# Patient Record
(demographics unavailable — no encounter records)

---

## 2025-02-17 NOTE — PLAN
[FreeTextEntry1] : BW drawn today POCT strep -negative Encourage prenatal vitamin Encourage 150 minutes of physical activity a week Encouraged well balanced diet, low carbohydrates/fatty foods follow up 1 year or sooner if needed

## 2025-02-17 NOTE — PHYSICAL EXAM
[No Acute Distress] : no acute distress [Well Nourished] : well nourished [Well Developed] : well developed [Normal Sclera/Conjunctiva] : normal sclera/conjunctiva [Well-Appearing] : well-appearing [PERRL] : pupils equal round and reactive to light [EOMI] : extraocular movements intact [Normal Outer Ear/Nose] : the outer ears and nose were normal in appearance [Normal Oropharynx] : the oropharynx was normal [No Lymphadenopathy] : no lymphadenopathy [Supple] : supple [Thyroid Normal, No Nodules] : the thyroid was normal and there were no nodules present [No Respiratory Distress] : no respiratory distress  [No Accessory Muscle Use] : no accessory muscle use [Clear to Auscultation] : lungs were clear to auscultation bilaterally [Normal Rate] : normal rate  [Regular Rhythm] : with a regular rhythm [Normal S1, S2] : normal S1 and S2 [No Murmur] : no murmur heard [No Varicosities] : no varicosities [Pedal Pulses Present] : the pedal pulses are present [No Edema] : there was no peripheral edema [Soft] : abdomen soft [Non Tender] : non-tender [Non-distended] : non-distended [No Masses] : no abdominal mass palpated [Normal Posterior Cervical Nodes] : no posterior cervical lymphadenopathy [Normal Anterior Cervical Nodes] : no anterior cervical lymphadenopathy [No CVA Tenderness] : no CVA  tenderness [No Spinal Tenderness] : no spinal tenderness [Grossly Normal Strength/Tone] : grossly normal strength/tone [No Rash] : no rash [Normal Gait] : normal gait [Normal Affect] : the affect was normal [Normal Insight/Judgement] : insight and judgment were intact

## 2025-02-17 NOTE — HISTORY OF PRESENT ILLNESS
[FreeTextEntry1] : CPE [de-identified] : 30yo F presents for CPE. Pt reports no acute complaints, no medications, no changes to medical hx since last CPE. Pt reports no BCM at this time, reports tracking menses and condom usage.   Pt denies any CP, palpitations, SOB, PICHARDO, fevers, chills, abdominal pain, N/V, diarrhea, constipation, BRBPR or dark tarry stools.

## 2025-02-17 NOTE — HEALTH RISK ASSESSMENT
[Good] : ~his/her~  mood as  good [No] : No [1 or 2 (0 pts)] : 1 or 2 (0 points) [Never (0 pts)] : Never (0 points) [0] : 2) Feeling down, depressed, or hopeless: Not at all (0) [PHQ-2 Negative - No further assessment needed] : PHQ-2 Negative - No further assessment needed [I have developed a follow-up plan documented below in the note.] : I have developed a follow-up plan documented below in the note. [Patient reported PAP Smear was normal] : Patient reported PAP Smear was normal [None] : None [With Significant Other] : lives with significant other [Employed] : employed [Significant Other] : lives with significant other [Sexually Active] : sexually active [Feels Safe at Home] : Feels safe at home [Fully functional (bathing, dressing, toileting, transferring, walking, feeding)] : Fully functional (bathing, dressing, toileting, transferring, walking, feeding) [Fully functional (using the telephone, shopping, preparing meals, housekeeping, doing laundry, using] : Fully functional and needs no help or supervision to perform IADLs (using the telephone, shopping, preparing meals, housekeeping, doing laundry, using transportation, managing medications and managing finances) [Never] : Never [Audit-CScore] : 0 [de-identified] : regularly [de-identified] : well balanced  [[id number]] : 0 [PapSmearDate] : 07/24 [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

## 2025-07-10 NOTE — HISTORY OF PRESENT ILLNESS
[Y] : Patient is sexually active [N] : Patient denies prior pregnancies [Menarche Age: ____] : age at menarche was [unfilled] [No] : Patient does not have concerns regarding sex [Currently Active] : currently active [Men] : men [TextBox_4] : Well woman visit No complaints [PapSmeardate] : 07/03/2024 [TextBox_31] : Negative [HPVDate] : 07/03/2024 [TextBox_78] : Negative  [LMPDate] : 07/03/25 [MensesFreq] : 28 [PGHxTotal] : 0 [FreeTextEntry1] : 07/03/25

## 2025-07-10 NOTE — PHYSICAL EXAM
[Chaperoned Physical Exam] : A chaperone was present in the examining room during all aspects of the physical examination. [MA] : MA [Appropriately responsive] : appropriately responsive [Alert] : alert [No Acute Distress] : no acute distress [Soft] : soft [Non-tender] : non-tender [Non-distended] : non-distended [Examination Of The Breasts] : a normal appearance [No Masses] : no breast masses were palpable [Labia Majora] : normal [Labia Minora] : normal [Normal] : normal [Uterine Adnexae] : normal [FreeTextEntry2] : Mary Ellen [FreeTextEntry6] : NEgative exam [FreeTextEntry8] : negative findings

## 2025-07-10 NOTE — PLAN
[FreeTextEntry1] :  #Well Woman Visit 1. Nutrition/Activity: The benefits of balanced diet and physical activity discussed with patient. 2. Health Screening: She was informed of the benefits of a screening colonoscopy/Mammo/Pap. - Cervix: We reviewed ASCCP/ACOG guidelines for pap smear screening. Last Pap July 2024, NILM; HPV: NEG. Pt desires and collected today. Discussed guidelines, next screening pending next year 3. Sex Health: The importance of safe-sex practices was discussed with the patient. STD screening was offered to patient, she desires gonorrhea and chlamydia, collected today. 4. Contraception: does not use  She verbalized understanding and agreement with above counseling regarding differential diagnosis, evaluation, and plan. She was given time for questions/concerns which were all answered to her apparent satisfaction. All designated lab work for today drawn in office.

## 2025-07-10 NOTE — COUNSELING
[Nutrition/ Exercise/ Weight Management] : nutrition, exercise, weight management [Bladder Hygiene] : bladder hygiene [Confidentiality] : confidentiality